# Patient Record
Sex: MALE | Race: WHITE | Employment: FULL TIME | URBAN - METROPOLITAN AREA
[De-identification: names, ages, dates, MRNs, and addresses within clinical notes are randomized per-mention and may not be internally consistent; named-entity substitution may affect disease eponyms.]

---

## 2018-06-25 ENCOUNTER — HOSPITAL ENCOUNTER (EMERGENCY)
Facility: CLINIC | Age: 43
Discharge: HOME | End: 2018-06-25
Attending: EMERGENCY MEDICINE | Admitting: EMERGENCY MEDICINE
Payer: COMMERCIAL

## 2018-06-25 VITALS
HEART RATE: 66 BPM | WEIGHT: 167.6 LBS | OXYGEN SATURATION: 99 % | SYSTOLIC BLOOD PRESSURE: 144 MMHG | RESPIRATION RATE: 18 BRPM | TEMPERATURE: 97.9 F | DIASTOLIC BLOOD PRESSURE: 76 MMHG

## 2018-06-25 DIAGNOSIS — Z23 NEED FOR RABIES VACCINATION: ICD-10-CM

## 2018-06-25 PROCEDURE — 90471 IMMUNIZATION ADMIN: CPT

## 2018-06-25 PROCEDURE — 99281 EMR DPT VST MAYX REQ PHY/QHP: CPT | Mod: Z6 | Performed by: EMERGENCY MEDICINE

## 2018-06-25 PROCEDURE — 99281 EMR DPT VST MAYX REQ PHY/QHP: CPT

## 2018-06-25 PROCEDURE — 90675 RABIES VACCINE IM: CPT | Performed by: EMERGENCY MEDICINE

## 2018-06-25 PROCEDURE — 25000128 H RX IP 250 OP 636: Performed by: EMERGENCY MEDICINE

## 2018-06-25 RX ADMIN — RABIES VACCINE 1 ML: KIT at 13:03

## 2018-06-25 ASSESSMENT — ENCOUNTER SYMPTOMS
FEVER: 0
CHILLS: 0
WOUND: 0

## 2018-06-25 NOTE — ED AVS SNAPSHOT
[facility], [place], Emergency Department    [address]    [facility] [address]    Phone:  [phone]    Fax:  [phone]                                       Allen Black   MRN: [id number]    Department:  [facility], Emergency Department   Date of Visit:  6/25/2018           After Visit Summary Signature Page     I have received my discharge instructions, and my questions have been answered. I have discussed any challenges I see with this plan with the nurse or doctor.    ..........................................................................................................................................  Patient/Patient Representative Signature      ..........................................................................................................................................  Patient Representative Print Name and Relationship to Patient    ..................................................               ................................................  Date                                            Time    ..........................................................................................................................................  Reviewed by Signature/Title    ...................................................              ..............................................  Date                                                            Time

## 2018-06-25 NOTE — ED AVS SNAPSHOT
[facility], Emergency Department    [address]    [place] [address]    Phone:  [phone]    Fax:  [phone]                                       Allen Black   MRN: [id number]    Department:  [facility], Emergency Department   Date of Visit:  6/25/2018           Patient Information     Date Of Birth          1975        Your diagnoses for this visit were:     Need for rabies vaccination        You were seen by Tisha Moeller MD.        Discharge Instructions       Please follow up with your already arranged subsequent vaccinations in 4 days and 11 days from today.  If you have any further concerns please return to the emergency department.    Discharge References/Attachments     RABIES VACCINE  SUSPENSION FOR INJECTION (ENGLISH)      24 Hour Appointment Hotline       To make an appointment at any [facility] clinic, call [phone] ([phone]). If you don't have a family doctor or clinic, we will help you find one. [facility] clinics are conveniently located to serve the needs of you and your family.             Review of your medicines      Notice     You have not been prescribed any medications.            Orders Needing Specimen Collection     None      Pending Results     No orders found from 6/23/2018 to 6/26/2018.            Pending Culture Results     No orders found from 6/23/2018 to 6/26/2018.            Pending Results Instructions     If you had any lab results that were not finalized at the time of your Discharge, you can call the ED Lab Result RN at [phone]. You will be contacted by this team for any positive Lab results or changes in treatment. The nurses are available 7 days a week from 10A to 6:30P.  You can leave a message 24 hours per day and they will return your call.        Thank you for choosing [facility]       Thank you for choosing [facility] for your care. Our goal is always to provide you with excellent care. Hearing back from our patients is one way we  "can continue to improve our services. Please take a few minutes to complete the written survey that you may receive in the mail after you visit with us. Thank you!        [employer] Information     [employer] lets you send messages to your doctor, view your test results, renew your prescriptions, schedule appointments and more. To sign up, go to [email or web address] . Click on \"Log in\" on the left side of the screen, which will take you to the Welcome page. Then click on \"Sign up Now\" on the right side of the page.     You will be asked to enter the access code listed below, as well as some personal information. Please follow the directions to create your username and password.     Your access code is: [email or web address]  Expires: 2018  1:07 PM     Your access code will  in 90 days. If you need help or a new code, please call your [facility] clinic or [phone].        Care EveryWhere ID     This is your Care EveryWhere ID. This could be used by other organizations to access your [facility] medical records  [id number]        Equal Access to Services     GERARDO RUIZ : Pepper Vazquez, gab edward, jasmine hawk. So [facility] [phone].    ATENCIÓN: Si habla español, tiene a masters disposición servicios gratuitos de asistencia lingüística. Ivette al [phone].    We comply with applicable federal civil rights laws and [place] laws. We do not discriminate on the basis of race, color, national origin, age, disability, sex, sexual orientation, or gender identity.            After Visit Summary       This is your record. Keep this with you and show to your community pharmacist(s) and doctor(s) at your next visit.                  "

## 2018-06-25 NOTE — DISCHARGE INSTRUCTIONS
Please follow up with your already arranged subsequent vaccinations in 4 days and 11 days from today.  If you have any further concerns please return to the emergency department.

## 2018-06-25 NOTE — ED TRIAGE NOTES
Family had a potential bat exposure Thursday last week.    Had first series shots on Friday.    Need second dose

## 2018-06-25 NOTE — ED PROVIDER NOTES
History     Chief Complaint   Patient presents with     Bat Exposure     Had bat exposure last Thursday and had first series of shots on Friday.  Is here for second series of shots today.     PABLO Black is a 42 year old male who is currently traveling through [place] from [place] and presents to the ED seeking the second rabies vaccination. The patient reports initially being exposed to a bat on 6/22/18, 3 days ago, and both him and his family were seen through an Urgent Care in [place] and given IgG and the first of the rabies vaccinations. He denies any adverse reactions to the vaccination or fevers.  The patient, who will be traveling when he requires the next rabies vaccination, has already sourced this and has plans to follow up as directed.        I have reviewed the Medications, Allergies, Past Medical and Surgical History, and Social History in the Epic system.    Review of Systems   Constitutional: Negative for chills and fever.   Skin: Negative for rash and wound.   Allergic/Immunologic: Negative for immunocompromised state.   All other systems reviewed and are negative.      Physical Exam   BP: 144/76  Pulse: 66  Temp: 97.9  F (36.6  C)  Resp: 18  Weight: 76 kg (167 lb 9.6 oz)  SpO2: 99 %      Physical Exam   Constitutional: He is oriented to person, place, and time. He appears well-developed and well-nourished. No distress.   HENT:   Head: Normocephalic and atraumatic.   Eyes: EOM are normal.   Neck: Normal range of motion.   Cardiovascular: Normal rate.    Pulmonary/Chest: Effort normal. No respiratory distress.   Musculoskeletal: Normal range of motion.   Neurological: He is alert and oriented to person, place, and time. He exhibits normal muscle tone.   Skin: No rash noted. He is not diaphoretic.   Psychiatric: He has a normal mood and affect. His behavior is normal. Judgment and thought content normal.   Nursing note and vitals reviewed.      ED Course     ED Course     Procedures              Critical Care time:  none             Labs Ordered and Resulted from Time of ED Arrival Up to the Time of Departure from the ED - No data to display         Assessments & Plan (with Medical Decision Making)   After history and physical exam patient appears to be in no acute distress.  He was given his second Rabavert vaccination.  He states that he already has an appointment scheduled for his third and fourth vaccination series in [place].  He agrees to follow-up.  At this point he is stable for discharge.    I have reviewed the nursing notes.    I have reviewed the findings, diagnosis, plan and need for follow up with the patient.    There are no discharge medications for this patient.      Final diagnoses:   Need for rabies vaccination   IMatthias, am serving as a trained medical scribe to document services personally performed by Tisha Moeller MD, based on the provider's statements to me.      Tisha LABOY MD, was physically present and have reviewed and verified the accuracy of this note documented by Matthias Burns.       6/25/2018   [facility], [place], EMERGENCY DEPARTMENT     Tisha Moeller MD  06/25/18 [id number]